# Patient Record
Sex: MALE | Race: WHITE
[De-identification: names, ages, dates, MRNs, and addresses within clinical notes are randomized per-mention and may not be internally consistent; named-entity substitution may affect disease eponyms.]

---

## 2019-01-29 ENCOUNTER — HOSPITAL ENCOUNTER (EMERGENCY)
Dept: HOSPITAL 97 - ER | Age: 54
Discharge: HOME | End: 2019-01-29
Payer: SELF-PAY

## 2019-01-29 DIAGNOSIS — F41.9: Primary | ICD-10-CM

## 2019-01-29 DIAGNOSIS — R00.2: ICD-10-CM

## 2019-01-29 DIAGNOSIS — I10: ICD-10-CM

## 2019-01-29 PROCEDURE — 99283 EMERGENCY DEPT VISIT LOW MDM: CPT

## 2019-01-29 NOTE — EDPHYS
Physician Documentation                                                                           

 [facility]                                                                

Name: Rito Yang                                                                                  

Age: 53 yrs                                                                                       

Sex: Male                                                                                         

: 1965                                                                                   

MRN: [id number]                                                                                   

Arrival Date: 2019                                                                          

Time: 19:58                                                                                       

Account#: [id number]                                                                            

Bed 18                                                                                            

Private MD:                                                                                       

ED Physician Db Avila                                                                         

HPI:                                                                                              

                                                                                             

20:35 This 53 yrs old  Male presents to ER via Ambulatory with complaints of Anxiety.pm1 

20:35 Onset: The symptoms/episode began/occurred 2 day(s) ago. Associated signs and symptoms: pm1 

      Pertinent positives: Palpitations, Pertinent negatives: abdominal pain, chest pain,         

      cough, fever, shortness of breath, sore throat, vomiting. Modifying factors: The            

      patient symptoms are alleviated by Beers for a short while, the patient symptoms are        

      aggravated by nothing. The patient has experienced similar episodes in the past,            

      multiple times. The patient has not recently seen a physician. diagnosed with anxiety       

      and has been prescribed medications in the past for it. Did not take the medications        

      because he did not like the way it made him feel after two doses. Last taken about 1.5      

      years ago. Patient drank some beers today to try to decrease his anxiety. No homicidal      

      or suicidal ideation.                                                                       

                                                                                                  

Historical:                                                                                       

- Allergies:                                                                                      

20:00 No Known Allergies;                                                                     aj  

- Home Meds:                                                                                      

20:00 Metoprolol Tartrate Oral [Active];                                                      aj  

- PMHx:                                                                                           

20:00 Hypertension; Anxiety;                                                                  aj  

- PSHx:                                                                                           

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  

                                                                                                  

- Immunization history:: Adult Immunizations up to date.                                          

- Social history:: Smoking status: Patient/guardian denies using tobacco, Patient uses            

  alcohol, occasionally.                                                                          

- Ebola Screening: : Patient negative for fever greater than or equal to 101.5 degrees            

  Fahrenheit, and additional compatible Ebola Virus Disease symptoms Patient denies               

  exposure to infectious person Patient denies travel to an Ebola-affected area in the            

  21 days before illness onset No symptoms or risks identified at this time.                      

                                                                                                  

                                                                                                  

ROS:                                                                                              

20:35 Constitutional: Negative for fever, chills, and weight loss, Eyes: Negative for injury, pm1 

      pain, redness, and discharge, ENT: Negative for injury, pain, and discharge, Neck:          

      Negative for injury, pain, and swelling, Respiratory: Negative for shortness of breath,     

      cough, wheezing, and pleuritic chest pain, Abdomen/GI: Negative for abdominal pain,         

      nausea, vomiting, diarrhea, and constipation.                                               

20:35 Back: Negative for injury and pain, : Negative for injury, bleeding, discharge, and       

      swelling, MS/Extremity: Negative for injury and deformity, Skin: Negative for injury,       

      rash, and discoloration, Neuro: Negative for headache, weakness, numbness, tingling,        

      and seizure.                                                                                

20:35 Cardiovascular: Positive for palpitations, with anxiety, Negative for chest pain,           

      edema, orthopnea.                                                                           

20:35 Psych: Positive for anxiety, Negative for depression, drug dependence, alcohol              

      dependence, homicidal ideation, insomnia, suicide gesture, suicidal ideation.               

                                                                                                  

Exam:                                                                                             

20:35 Constitutional:  This is a well developed, well nourished patient who is awake, alert,  pm1 

      and in no acute distress. Head/Face:  Normocephalic, atraumatic. Eyes:  Pupils equal        

      round and reactive to light, extra-ocular motions intact.  Lids and lashes normal.          

      Conjunctiva and sclera are non-icteric and not injected.  Cornea within normal limits.      

      Periorbital areas with no swelling, redness, or edema. ENT:  Nares patent. No nasal         

      discharge, no septal abnormalities noted.  Tympanic membranes are normal and external       

      auditory canals are clear.  Oropharynx with no redness, swelling, or masses, exudates,      

      or evidence of obstruction, uvula midline.  Mucous membranes moist. Neck:  Trachea          

      midline, no thyromegaly or masses palpated, and no cervical lymphadenopathy.  Supple,       

      full range of motion without nuchal rigidity, or vertebral point tenderness.  No            

      Meningismus. Chest/axilla:  Normal chest wall appearance and motion.  Nontender with no     

      deformity.  No lesions are appreciated. Cardiovascular:  Regular rate and rhythm with a     

      normal S1 and S2.  No gallops, murmurs, or rubs.  Normal PMI, no JVD.  No pulse             

      deficits. Respiratory:  Lungs have equal breath sounds bilaterally, clear to                

      auscultation and percussion.  No rales, rhonchi or wheezes noted.  No increased work of     

      breathing, no retractions or nasal flaring. Abdomen/GI:  Soft, non-tender, with normal      

      bowel sounds.  No distension or tympany.  No guarding or rebound.  No evidence of           

      tenderness throughout. Back:  No spinal tenderness.  No costovertebral tenderness.          

      Full range of motion. Skin:  Warm, dry with normal turgor.  Normal color with no            

      rashes, no lesions, and no evidence of cellulitis. MS/ Extremity:  Pulses equal, no         

      cyanosis.  Neurovascular intact.  Full, normal range of motion.                             

20:35 Neuro: Orientation: is normal, Motor: is normal, moves all fours.                           

20:35 Psych: Behavior/mood is anxious, Affect is animated, Oriented to person, place, time,       

      Patient has no thoughts/intents to harm self or others. Judgement / Insight is normal.      

      Delusions/hallucinations are not present.                                                   

                                                                                                  

Vital Signs:                                                                                      

20:00  / 65; Pulse 98; Resp 20; Temp 98.0; Pulse Ox 96% on R/A; Weight 90.72 kg; Height aj  

      5 ft. 5 in. (165.10 cm);                                                                    

21:15  / 70; Pulse 80; Resp 18; Pulse Ox 97% on R/A;                                    ea  

20:00 Body Mass Index 33.28 (90.72 kg, 165.10 cm)                                             aj  

                                                                                                  

MDM:                                                                                              

20:11 Patient medically screened.                                                             pm1 

21:17 Data reviewed: vital signs. Data interpreted: Pulse oximetry: on room air is 96 %.      pm1 

      Interpretation: normal. Counseling: I had a detailed discussion with the patient and/or     

      guardian regarding: the historical points, exam findings, and any diagnostic results        

      supporting the discharge/admit diagnosis, the need for outpatient follow up, a family       

      practitioner, a psychiatrist, to return to the emergency department if symptoms worsen      

      or persist or if there are any questions or concerns that arise at home.                    

21:19 Refusal of service: The patient/guardian displays adequate decision making capability   pm1 

      and despite a detailed discussion of alternatives, benefits, risks, and consequences        

      refuses: all lab tests, all X-rays, evaluation of palpitation with his anxiety. Patient     

      just wants medication for anxiety. He does not want labs drawn, ECG, or xrays.              

                                                                                                  

Administered Medications:                                                                         

20:35 Drug: hydrOXYzine 50 mg Route: PO;                                                      ea  

21:00 Follow up: Response: No adverse reaction; Marked relief of symptoms                     ea  

                                                                                                  

                                                                                                  

Disposition:                                                                                      

23:02 Co-signature as Attending Physician, Db Avila MD.                                    rn  

                                                                                                  

Disposition:                                                                                      

19 21:18 Discharged to Home. Impression: Anxiety disorder, unspecified.                     

- Condition is Stable.                                                                            

- Discharge Instructions: Generalized Anxiety Disorder.                                           

- Prescriptions for Hydroxyzine HCl 50 mg Oral Tablet - take 1 tablet by ORAL route               

  every 8 hours As needed; 20 tablet.                                                             

- Medication Reconciliation Form, Thank You Letter form.                                          

- Follow up: Emergency Department; When: As needed; Reason: Worsening of condition.               

  Follow up: Private Physician; When: 2 - 3 days; Reason: Recheck today's complaints,             

  Continuance of care, Re-evaluation by your physician.                                           

- Problem is new.                                                                                 

- Symptoms have improved.                                                                         

                                                                                                  

                                                                                                  

                                                                                                  

Signatures:                                                                                       

Vanessa Martin RN RN aj Nieto, Roman, MD MD rn Marinas, Patrick, NP                    NP   pm1                                                  

Gris Jensen RN RN ea                                                   

                                                                                                  

Corrections: (The following items were deleted from the chart)                                    

21:34 21:18 2019 21:18 Discharged to Home. Impression: Anxiety disorder, unspecified.   ea  

      Condition is Stable. Forms are Medication Reconciliation Form, Thank You Letter,            

      Antibiotic Education, Prescription Opioid Use. Follow up: Emergency Department; When:       

      As needed; Reason: Worsening of condition. Follow up: Private Physician; When: 2 - 3        

      days; Reason: Recheck today's complaints, Continuance of care, Re-evaluation by your        

      physician. Problem is new. Symptoms have improved. pm1                                      

                                                                                                  

**************************************************************************************************

## 2019-01-29 NOTE — ER
Nurse's Notes                                                                                     

 [facility]                                                                

Name: Rito Yang                                                                                  

Age: 53 yrs                                                                                       

Sex: Male                                                                                         

: 1965                                                                                   

MRN: [id number]                                                                                   

Arrival Date: 2019                                                                          

Time: 19:58                                                                                       

Account#: [id number]                                                                            

Bed 18                                                                                            

Private MD:                                                                                       

Diagnosis: Anxiety disorder, unspecified                                                          

                                                                                                  

Presentation:                                                                                     

                                                                                             

19:58 Presenting complaint: Patient states: Reports feeling anxious for the past 2 days.      aj  

      Patient reports that he has had a hx of anxiety but did not take prescribed medication      

      for anxiety. Patient reports drinking 2 or 3 beers PTA. Transition of care: patient was     

      not received from another setting of care. Onset of symptoms was 2019. Risk     

      Assessment: Do you want to hurt yourself or someone else? Patient reports no desire to      

      harm self or others. Initial Sepsis Screen: Does the patient meet any 2 criteria? No.       

      Patient's initial sepsis screen is negative. Does the patient have a suspected source       

      of infection? No. Patient's initial sepsis screen is negative. Care prior to arrival:       

      None.                                                                                       

19:58 Method Of Arrival: Ambulatory                                                             

19:58 Acuity: RGAINI 3                                                                             

                                                                                                  

Triage Assessment:                                                                                

20:00 General: Appears in no apparent distress. uncomfortable, Behavior is appropriate for    aj  

      age, anxious, Smells of alcohol. Pain: Denies pain. Neuro: Level of Consciousness is        

      awake, alert, obeys commands, Oriented to person, place, time, situation, Appropriate       

      for age. Respiratory: Airway is patent Respiratory effort is even, unlabored,               

      Respiratory pattern is regular, symmetrical. Derm: Skin is intact, is healthy with good     

      turgor, Skin is pink, warm \T\ dry. normal.                                                 

                                                                                                  

Historical:                                                                                       

- Allergies:                                                                                      

20:00 No Known Allergies;                                                                     aj  

- Home Meds:                                                                                      

20:00 Metoprolol Tartrate Oral [Active];                                                      aj  

- PMHx:                                                                                           

20:00 Hypertension; Anxiety;                                                                  aj  

- PSHx:                                                                                           

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  

                                                                                                  

- Immunization history:: Adult Immunizations up to date.                                          

- Social history:: Smoking status: Patient/guardian denies using tobacco, Patient uses            

  alcohol, occasionally.                                                                          

- Ebola Screening: : Patient negative for fever greater than or equal to 101.5 degrees            

  Fahrenheit, and additional compatible Ebola Virus Disease symptoms Patient denies               

  exposure to infectious person Patient denies travel to an Ebola-affected area in the            

  21 days before illness onset No symptoms or risks identified at this time.                      

                                                                                                  

                                                                                                  

Screenin:37 Abuse screen: Denies threats or abuse. Nutritional screening: No deficits noted.        ea  

      Tuberculosis screening: No symptoms or risk factors identified. Fall Risk None              

      identified.                                                                                 

                                                                                                  

Assessment:                                                                                       

20:35 General: Appears in no apparent distress. Behavior is anxious. Neuro: Level of          ea  

      Consciousness is awake, alert, obeys commands, Oriented to person, place, time,             

      situation. Cardiovascular: Patient's skin is warm and dry. Respiratory: Airway is           

      patent Respiratory effort is even, unlabored, Respiratory pattern is regular,               

      symmetrical. Derm: Skin is pink, warm \T\ dry. Musculoskeletal: Circulation, motion, and    

      sensation intact.                                                                           

21:32 Reassessment: Patient and/or family updated on plan of care and expected duration. Pain ea  

      level reassessed. Patient is alert, oriented x 3, equal unlabored respirations, skin        

      warm/dry/pink. Discharge instructions given to patient and family, both verbalized the      

      understanding of isntruction Patient states feeling better. Patient states symptoms         

      have improved.                                                                              

                                                                                                  

Vital Signs:                                                                                      

20:00  / 65; Pulse 98; Resp 20; Temp 98.0; Pulse Ox 96% on R/A; Weight 90.72 kg; Height aj  

      5 ft. 5 in. (165.10 cm);                                                                    

21:15  / 70; Pulse 80; Resp 18; Pulse Ox 97% on R/A;                                    ea  

20:00 Body Mass Index 33.28 (90.72 kg, 165.10 cm)                                             aj  

                                                                                                  

ED Course:                                                                                        

19:58 Patient arrived in ED.                                                                  aj  

19:59 Triage completed.                                                                       aj  

20:00 Arm band placed on left wrist. Patient placed in an exam room.                          aj  

20:03 Chidi Gaston NP is PHCP.                                                           pm1 

20:03 Db Avila MD is Attending Physician.                                                pm1 

20:35 Gris Jensen RN is Primary Nurse.                                                    ea  

20:39 Patient has correct armband on for positive identification. Bed in low position. Call   ea  

      light in reach. Side rails up X 1.                                                          

21:32 No provider procedures requiring assistance completed. Patient did not have IV access   ea  

      during this emergency room visit.                                                           

                                                                                                  

Administered Medications:                                                                         

20:35 Drug: hydrOXYzine 50 mg Route: PO;                                                      ea  

21:00 Follow up: Response: No adverse reaction; Marked relief of symptoms                     ea  

                                                                                                  

                                                                                                  

Outcome:                                                                                          

21:18 Discharge ordered by MD.                                                                pm1 

21:33 Discharged to home ambulatory, with family.                                             ea  

21:33 Condition: improved                                                                         

21:33 Discharge instructions given to patient, family, Instructed on discharge instructions,      

      follow up and referral plans. medication usage, Demonstrated understanding of               

      instructions, follow-up care, medications, Prescriptions given X 1.                         

21:34 Patient left the ED.                                                                    ea  

                                                                                                  

Signatures:                                                                                       

Vanessa Martin RN RN aj Marinas, Patrick, MELO                    NP   pm1                                                  

Gris Jensen RN RN ea                                                   

                                                                                                  

**************************************************************************************************